# Patient Record
Sex: MALE | Race: BLACK OR AFRICAN AMERICAN | NOT HISPANIC OR LATINO | Employment: UNEMPLOYED | URBAN - METROPOLITAN AREA
[De-identification: names, ages, dates, MRNs, and addresses within clinical notes are randomized per-mention and may not be internally consistent; named-entity substitution may affect disease eponyms.]

---

## 2019-08-09 PROBLEM — I25.10 CAD S/P PERCUTANEOUS CORONARY ANGIOPLASTY: Status: ACTIVE | Noted: 2019-08-09

## 2019-08-09 PROBLEM — R73.03 BORDERLINE TYPE 2 DIABETES MELLITUS: Status: ACTIVE | Noted: 2017-03-31

## 2019-08-09 PROBLEM — Z98.61 CAD S/P PERCUTANEOUS CORONARY ANGIOPLASTY: Status: ACTIVE | Noted: 2019-08-09

## 2022-11-20 PROBLEM — E66.811 CLASS 1 OBESITY DUE TO EXCESS CALORIES WITH SERIOUS COMORBIDITY AND BODY MASS INDEX (BMI) OF 30.0 TO 30.9 IN ADULT: Status: ACTIVE | Noted: 2022-11-20

## 2022-11-20 PROBLEM — E66.09 CLASS 1 OBESITY DUE TO EXCESS CALORIES WITH SERIOUS COMORBIDITY AND BODY MASS INDEX (BMI) OF 30.0 TO 30.9 IN ADULT: Status: ACTIVE | Noted: 2022-11-20

## 2022-11-20 PROBLEM — E11.9 TYPE 2 DIABETES MELLITUS WITHOUT COMPLICATION, WITHOUT LONG-TERM CURRENT USE OF INSULIN: Status: ACTIVE | Noted: 2017-03-31

## 2022-11-20 PROBLEM — R07.2 PRECORDIAL PAIN: Status: ACTIVE | Noted: 2022-11-20

## 2022-11-20 PROBLEM — I21.4 NSTEMI (NON-ST ELEVATED MYOCARDIAL INFARCTION): Status: ACTIVE | Noted: 2022-11-20

## 2022-11-20 PROBLEM — F10.10 ALCOHOL ABUSE: Status: ACTIVE | Noted: 2022-11-20

## 2022-11-20 PROBLEM — J18.9 PNEUMONIA OF BOTH LOWER LOBES DUE TO INFECTIOUS ORGANISM: Status: ACTIVE | Noted: 2022-11-20

## 2022-11-20 PROBLEM — R14.0 ABDOMINAL DISTENSION: Status: ACTIVE | Noted: 2022-11-20

## 2022-11-20 PROBLEM — K43.9 VENTRAL HERNIA WITHOUT OBSTRUCTION OR GANGRENE: Status: ACTIVE | Noted: 2022-11-20

## 2022-11-20 PROBLEM — R06.02 SOB (SHORTNESS OF BREATH): Status: ACTIVE | Noted: 2022-11-20

## 2022-11-21 PROBLEM — D69.6 THROMBOCYTOPENIA: Status: ACTIVE | Noted: 2022-11-21

## 2022-11-21 PROBLEM — E80.6 HYPERBILIRUBINEMIA: Status: ACTIVE | Noted: 2022-11-21

## 2022-11-22 PROBLEM — J11.1 INFLUENZA: Status: ACTIVE | Noted: 2022-11-22

## 2023-02-27 PROBLEM — I21.4 NSTEMI (NON-ST ELEVATED MYOCARDIAL INFARCTION): Status: RESOLVED | Noted: 2022-11-20 | Resolved: 2023-02-27

## 2023-02-27 PROBLEM — J18.9 PNEUMONIA OF BOTH LOWER LOBES DUE TO INFECTIOUS ORGANISM: Status: RESOLVED | Noted: 2022-11-20 | Resolved: 2023-02-27

## 2024-03-11 PROBLEM — N17.9 AKI (ACUTE KIDNEY INJURY): Status: ACTIVE | Noted: 2024-03-11

## 2024-03-11 PROBLEM — I16.1 HYPERTENSIVE EMERGENCY: Status: ACTIVE | Noted: 2024-03-11

## 2024-03-11 PROBLEM — I50.9 HEART FAILURE, UNSPECIFIED: Status: ACTIVE | Noted: 2024-03-11

## 2024-03-12 PROBLEM — Z86.73 HISTORY OF CVA (CEREBROVASCULAR ACCIDENT): Status: ACTIVE | Noted: 2024-03-12

## 2024-03-12 PROBLEM — I50.41 ACUTE COMBINED SYSTOLIC AND DIASTOLIC HEART FAILURE: Status: ACTIVE | Noted: 2024-03-11

## 2024-03-14 PROBLEM — N18.31 STAGE 3A CHRONIC KIDNEY DISEASE: Status: ACTIVE | Noted: 2024-03-14

## 2024-03-18 ENCOUNTER — PATIENT OUTREACH (OUTPATIENT)
Dept: ADMINISTRATIVE | Facility: CLINIC | Age: 64
End: 2024-03-18

## 2024-03-18 NOTE — PROGRESS NOTES
C3 nurse spoke with Shoaib Hua for a TCC post hospital discharge follow up call. Pt denies any new symptoms and confirms he has the OOC number to call for any new or worsening symptoms.    The patient does not have a scheduled HOSFU appointment with his PCP, [place], [facility] within 5-7 days post hospital discharge date of 3/14/24. C3 nurse was unable to schedule HOSFU appointment in [facility] with his PCP or [facility]. Unable to route to [place], [facility].

## 2024-03-26 ENCOUNTER — PATIENT OUTREACH (OUTPATIENT)
Dept: ADMINISTRATIVE | Facility: HOSPITAL | Age: 64
End: 2024-03-26

## 2024-03-26 DIAGNOSIS — E11.9 TYPE 2 DIABETES MELLITUS WITHOUT COMPLICATION, WITHOUT LONG-TERM CURRENT USE OF INSULIN: Primary | ICD-10-CM

## 2024-06-17 PROBLEM — N17.9 AKI (ACUTE KIDNEY INJURY): Status: RESOLVED | Noted: 2024-03-11 | Resolved: 2024-06-17

## 2024-06-20 ENCOUNTER — PATIENT OUTREACH (OUTPATIENT)
Dept: ADMINISTRATIVE | Facility: HOSPITAL | Age: 64
End: 2024-06-20

## 2024-09-28 PROBLEM — R07.9 CHEST PAIN: Status: ACTIVE | Noted: 2024-09-28

## 2024-09-28 PROBLEM — I16.0 HYPERTENSIVE URGENCY: Status: ACTIVE | Noted: 2024-09-28

## 2024-09-28 PROBLEM — N18.9 CKD (CHRONIC KIDNEY DISEASE): Status: ACTIVE | Noted: 2024-09-28

## 2024-09-28 PROBLEM — I50.22 HEART FAILURE WITH MID-RANGE EJECTION FRACTION (HFMEF): Status: ACTIVE | Noted: 2024-09-28

## 2024-09-29 PROBLEM — R07.9 CHEST PAIN: Status: RESOLVED | Noted: 2024-09-28 | Resolved: 2024-09-29

## 2024-10-04 ENCOUNTER — PATIENT OUTREACH (OUTPATIENT)
Dept: ADMINISTRATIVE | Facility: CLINIC | Age: 64
End: 2024-10-04

## 2024-10-04 NOTE — PROGRESS NOTES
C3 nurse attempted to contact Shoaib Hua for a TCC post hospital discharge follow up call. No answer. Left voicemail with callback information. The patient has a scheduled HOSFU appointment with [facility] TCC clinic on 10/9/24 @ 9:30am.

## 2024-11-02 PROBLEM — H40.023 OAG (OPEN ANGLE GLAUCOMA) SUSPECT, HIGH RISK, BILATERAL: Status: ACTIVE | Noted: 2024-11-02

## 2024-11-02 PROBLEM — H25.813 COMBINED FORM OF AGE-RELATED CATARACT, BOTH EYES: Status: ACTIVE | Noted: 2024-11-02

## 2024-12-27 PROBLEM — R22.0 FACIAL SWELLING: Status: ACTIVE | Noted: 2024-12-27

## 2025-01-03 PROBLEM — I50.41 ACUTE COMBINED SYSTOLIC AND DIASTOLIC HEART FAILURE: Status: RESOLVED | Noted: 2024-03-11 | Resolved: 2025-01-03

## 2025-01-03 PROBLEM — R07.2 PRECORDIAL PAIN: Status: RESOLVED | Noted: 2022-11-20 | Resolved: 2025-01-03

## 2025-01-03 PROBLEM — N18.32 STAGE 3B CHRONIC KIDNEY DISEASE: Status: ACTIVE | Noted: 2024-09-28

## 2025-01-03 PROBLEM — E66.01 SEVERE OBESITY (BMI 35.0-39.9) WITH COMORBIDITY: Status: ACTIVE | Noted: 2025-01-03

## 2025-01-03 PROBLEM — I16.0 HYPERTENSIVE URGENCY: Status: RESOLVED | Noted: 2024-09-28 | Resolved: 2025-01-03

## 2025-01-03 PROBLEM — N18.32 STAGE 3B CHRONIC KIDNEY DISEASE: Status: ACTIVE | Noted: 2024-03-14

## 2025-01-03 PROBLEM — N18.32 STAGE 3B CHRONIC KIDNEY DISEASE: Status: ACTIVE | Noted: 2025-01-03

## 2025-01-03 PROBLEM — E11.21 DIABETIC NEPHROPATHY ASSOCIATED WITH TYPE 2 DIABETES MELLITUS: Status: ACTIVE | Noted: 2025-01-03

## 2025-01-03 PROBLEM — I16.1 HYPERTENSIVE EMERGENCY: Status: RESOLVED | Noted: 2024-03-11 | Resolved: 2025-01-03

## 2025-01-03 PROBLEM — J11.1 INFLUENZA: Status: RESOLVED | Noted: 2022-11-22 | Resolved: 2025-01-03

## 2025-06-10 PROBLEM — I10 HYPERTENSION: Status: ACTIVE | Noted: 2025-06-10

## 2025-06-10 PROBLEM — I63.50 POSTERIOR CIRCULATION STROKE: Status: ACTIVE | Noted: 2025-06-10

## 2025-06-11 PROBLEM — I63.212 ACUTE ARTERIAL ISCHEMIC STROKE, VERTEBROBASILAR, BRAINSTEM, LEFT: Status: ACTIVE | Noted: 2025-06-10

## 2025-06-11 PROBLEM — I63.22 ACUTE ARTERIAL ISCHEMIC STROKE, VERTEBROBASILAR, BRAINSTEM, LEFT: Status: ACTIVE | Noted: 2025-06-10

## 2025-06-14 PROBLEM — M10.072 ACUTE IDIOPATHIC GOUT OF LEFT FOOT: Status: ACTIVE | Noted: 2025-06-14

## 2025-06-14 PROBLEM — R29.818 ACUTE FOCAL NEUROLOGICAL DEFICIT: Status: ACTIVE | Noted: 2025-06-14

## 2025-06-15 PROBLEM — N17.9 AKI (ACUTE KIDNEY INJURY): Status: ACTIVE | Noted: 2025-06-15

## 2025-06-16 PROBLEM — I65.09: Status: ACTIVE | Noted: 2025-06-16

## 2025-06-16 PROBLEM — M10.9 ACUTE GOUT: Status: ACTIVE | Noted: 2025-06-16

## 2025-06-16 PROBLEM — I63.9 CEREBROVASCULAR ACCIDENT (CVA): Status: ACTIVE | Noted: 2025-06-10

## 2025-08-07 PROBLEM — R14.0 ABDOMINAL DISTENSION: Status: RESOLVED | Noted: 2022-11-20 | Resolved: 2025-08-07

## 2025-08-07 PROBLEM — M10.072 ACUTE IDIOPATHIC GOUT OF LEFT FOOT: Status: RESOLVED | Noted: 2025-06-14 | Resolved: 2025-08-07

## 2025-08-07 PROBLEM — E80.6 HYPERBILIRUBINEMIA: Status: RESOLVED | Noted: 2022-11-21 | Resolved: 2025-08-07

## 2025-08-07 PROBLEM — K43.9 VENTRAL HERNIA WITHOUT OBSTRUCTION OR GANGRENE: Status: RESOLVED | Noted: 2022-11-20 | Resolved: 2025-08-07

## 2025-08-07 PROBLEM — M10.9 ACUTE GOUT: Status: RESOLVED | Noted: 2025-06-16 | Resolved: 2025-08-07

## 2025-08-07 PROBLEM — D69.6 THROMBOCYTOPENIA: Status: RESOLVED | Noted: 2022-11-21 | Resolved: 2025-08-07

## 2025-08-28 ENCOUNTER — OUTPATIENT CASE MANAGEMENT (OUTPATIENT)
Dept: ADMINISTRATIVE | Facility: OTHER | Age: 65
End: 2025-08-28

## 2025-09-04 ENCOUNTER — OUTPATIENT CASE MANAGEMENT (OUTPATIENT)
Dept: ADMINISTRATIVE | Facility: OTHER | Age: 65
End: 2025-09-04